# Patient Record
Sex: MALE | Race: WHITE | Employment: FULL TIME | URBAN - NONMETROPOLITAN AREA
[De-identification: names, ages, dates, MRNs, and addresses within clinical notes are randomized per-mention and may not be internally consistent; named-entity substitution may affect disease eponyms.]

---

## 2018-03-31 ENCOUNTER — OFFICE VISIT (OUTPATIENT)
Dept: URGENT CARE | Facility: PHYSICIAN GROUP | Age: 23
End: 2018-03-31
Payer: OTHER MISCELLANEOUS

## 2018-03-31 VITALS
WEIGHT: 135 LBS | RESPIRATION RATE: 16 BRPM | BODY MASS INDEX: 20.46 KG/M2 | HEIGHT: 68 IN | SYSTOLIC BLOOD PRESSURE: 122 MMHG | TEMPERATURE: 99.1 F | HEART RATE: 92 BPM | OXYGEN SATURATION: 97 % | DIASTOLIC BLOOD PRESSURE: 68 MMHG

## 2018-03-31 DIAGNOSIS — R60.0 FACIAL EDEMA: ICD-10-CM

## 2018-03-31 DIAGNOSIS — K11.20 SALIVARY GLAND INFECTION: Primary | ICD-10-CM

## 2018-03-31 PROCEDURE — 99214 OFFICE O/P EST MOD 30 MIN: CPT | Performed by: PHYSICIAN ASSISTANT

## 2018-03-31 RX ORDER — PREDNISONE 20 MG/1
TABLET ORAL
Qty: 3 TAB | Refills: 0 | Status: SHIPPED | OUTPATIENT
Start: 2018-03-31

## 2018-03-31 RX ORDER — AMOXICILLIN AND CLAVULANATE POTASSIUM 875; 125 MG/1; MG/1
1 TABLET, FILM COATED ORAL 2 TIMES DAILY
Qty: 20 TAB | Refills: 0 | Status: SHIPPED | OUTPATIENT
Start: 2018-03-31 | End: 2018-04-10

## 2018-03-31 ASSESSMENT — ENCOUNTER SYMPTOMS: CONSTITUTIONAL NEGATIVE: 1

## 2018-03-31 NOTE — PATIENT INSTRUCTIONS
Recheck in 3 days.  REturn sooner if developing abscess.  Hot moist compresses to draw out infection if draining.

## 2018-03-31 NOTE — PROGRESS NOTES
"Subjective:      Emigdio Olson is a 22 y.o. male who presents with Facial Swelling (gums on left side swollen x 4 days)            HPI  Chief Complaint   Patient presents with   • Facial Swelling     gums on left side swollen x 4 days       HPI:  Emigdio Olson is a 22 y.o. male who presents with left sided mouth swelling for the last 4 days.  PResents with female .  Started with pain around posterior lower molar. Having lots of salivation. No bad taste. EMT friend said he might have an abscess. Has not shaved since cheek swelling. Patient denies HA, SOB, chest pain, palpitations, fever, chills, or n/v/d.      History reviewed. No pertinent past medical history.    History reviewed. No pertinent surgical history.    History reviewed. No pertinent family history.  No pertinent family history.    Social History     Social History   • Marital status: Unknown     Spouse name: N/A   • Number of children: N/A   • Years of education: N/A     Occupational History   • Not on file.     Social History Main Topics   • Smoking status: Never Smoker   • Smokeless tobacco: Never Used   • Alcohol use No   • Drug use: No   • Sexual activity: Not on file     Other Topics Concern   • Not on file     Social History Narrative   • No narrative on file         Current Outpatient Prescriptions:   •  Multiple Vitamin (MULTI VITAMIN DAILY PO), Take  by mouth., 3/25/2016  •  B Complex Vitamins (VITAMIN B COMPLEX PO), Take  by mouth., 3/25/2016    No Known Allergies     Review of Systems   Constitutional: Negative.    HENT:        Facial swelling   All other systems reviewed and are negative.         Objective:     /68   Pulse 92   Temp 37.3 °C (99.1 °F)   Resp 16   Ht 1.727 m (5' 8\")   Wt 61.2 kg (135 lb)   SpO2 97%   BMI 20.53 kg/m²      Physical Exam       Nursing note and Vitals Reviewed.    Constitutional:   Appropriately groomed, pleasant affect, well nourished, in NAD.    Head:   Normocephalic, atraumatic.    Eyes: "   PERRLA, EOM's full, sclera white, conjunctiva not erythematous, and medial canthus without exudate bilaterally.    Ears:  Auricle and tragus non-tender to manipulation.  No pre-auricular lymphadenopathy or mastoid ttp.  EACs with mild cerumen bilaterally, not erythematous.  TM’s pearly gray with cone of light present and umbo and malleolus visible bilaterally.  No bulging or fluid bubbles present in middle ear.  Hearing grossly intact to voice.    Nose:  Nares patent bilaterally.  Nasal mucosa edematous with white rhinorrhea bilaterally. Sinuses not tender to percussion.    Throat:  Dentition within normal limits, mucosa moist without lesions.  Oropharynx not  erythematous, with no enlargement of the palatine tonsils bilaterally with out exudates.   Left posterior molar #18 with erythematous gums with hypertrophy. Left lower cheek with swelling and glandular type tissue to palpation. No superficial erythema buccal or external surface. Stensen's duct with slight erythema.  Mild post nasal drainage present.  Soft palate rises symmetrically bilaterally and uvula midline.      Neck: Neck supple, with moderate anterior lymphadenopathy that is soft and mobile to palpation. Thyroid non-palpable without tenderness or nodules. No supraclavicular lymphadenopathy.    Lungs:  Respiratory effort not labored without accessory muscle use.  Lungs clear to auscultation bilaterally without wheezes, rales, or rhonchi.    Heart:  RRR, without murmurs rubs or gallops.  Radial and dorsalis pedis pulse 2+ bilaterally.  No LE edema.    Musculoskeletal:  Gait non-antalgic with a narrow base.    Derm:  Skin without rashes or lesions with good turgor pressure.      Psychiatric:  Mood, affect, and judgement appropriate.       Assessment/Plan:     1. Salivary gland infection  amoxicillin-clavulanate (AUGMENTIN) 875-125 MG Tab    predniSONE (DELTASONE) 20 MG Tab   2. Facial edema  amoxicillin-clavulanate (AUGMENTIN) 875-125 MG Tab     predniSONE (DELTASONE) 20 MG Tab     Patient presents with left facial edema with no evidence of cellulitis or abscess. Suspect salivary gland, accessory gland infection. Recommended hard candy to promote salivation. Prescribed Augmentin and prednisone pulse. Advised follow-up in about 2-3 days for reevaluation. ER if symptoms worsen.    Patient was in agreement with this treatment plan and seemed to understand without barriers. All questions were encouraged and answered.  Reviewed signs and symptoms of when to seek emergency medical care.     Please note that this dictation was created using voice recognition software.  I have made every reasonable attempt to correct obvious errors, but I expect there are errors of vonnie and possibly content that I did not discover before finalizing the note.